# Patient Record
Sex: FEMALE | Race: WHITE | NOT HISPANIC OR LATINO | URBAN - METROPOLITAN AREA
[De-identification: names, ages, dates, MRNs, and addresses within clinical notes are randomized per-mention and may not be internally consistent; named-entity substitution may affect disease eponyms.]

---

## 2022-05-30 PROCEDURE — 99223 PR INITIAL HOSPITAL CARE,LEVL III: ICD-10-PCS | Mod: ,,, | Performed by: STUDENT IN AN ORGANIZED HEALTH CARE EDUCATION/TRAINING PROGRAM

## 2022-05-30 PROCEDURE — 99223 1ST HOSP IP/OBS HIGH 75: CPT | Mod: ,,, | Performed by: STUDENT IN AN ORGANIZED HEALTH CARE EDUCATION/TRAINING PROGRAM

## 2022-06-01 DIAGNOSIS — I82.4Y9 DEEP VEIN THROMBOSIS (DVT) OF PROXIMAL LOWER EXTREMITY, UNSPECIFIED CHRONICITY, UNSPECIFIED LATERALITY: Primary | ICD-10-CM

## 2022-06-08 ENCOUNTER — OUTSIDE PLACE OF SERVICE (OUTPATIENT)
Dept: ADMINISTRATIVE | Facility: OTHER | Age: 52
End: 2022-06-08
Payer: MEDICAID

## 2022-06-16 ENCOUNTER — OFFICE VISIT (OUTPATIENT)
Dept: HEMATOLOGY/ONCOLOGY | Facility: CLINIC | Age: 52
End: 2022-06-16
Payer: MEDICAID

## 2022-06-16 VITALS
HEIGHT: 65 IN | BODY MASS INDEX: 47.02 KG/M2 | SYSTOLIC BLOOD PRESSURE: 142 MMHG | HEART RATE: 77 BPM | DIASTOLIC BLOOD PRESSURE: 90 MMHG | OXYGEN SATURATION: 96 % | RESPIRATION RATE: 20 BRPM | WEIGHT: 282.19 LBS

## 2022-06-16 DIAGNOSIS — I26.99 OTHER ACUTE PULMONARY EMBOLISM WITHOUT ACUTE COR PULMONALE: ICD-10-CM

## 2022-06-16 DIAGNOSIS — I82.4Z1 ACUTE DEEP VEIN THROMBOSIS (DVT) OF DISTAL VEIN OF RIGHT LOWER EXTREMITY: ICD-10-CM

## 2022-06-16 DIAGNOSIS — Z13.9 ENCOUNTER FOR HEALTH-RELATED SCREENING: Primary | ICD-10-CM

## 2022-06-16 DIAGNOSIS — I82.4Y9 DEEP VEIN THROMBOSIS (DVT) OF PROXIMAL LOWER EXTREMITY, UNSPECIFIED CHRONICITY, UNSPECIFIED LATERALITY: ICD-10-CM

## 2022-06-16 PROCEDURE — 3077F PR MOST RECENT SYSTOLIC BLOOD PRESSURE >= 140 MM HG: ICD-10-PCS | Mod: CPTII,,, | Performed by: STUDENT IN AN ORGANIZED HEALTH CARE EDUCATION/TRAINING PROGRAM

## 2022-06-16 PROCEDURE — 1159F PR MEDICATION LIST DOCUMENTED IN MEDICAL RECORD: ICD-10-PCS | Mod: CPTII,,, | Performed by: STUDENT IN AN ORGANIZED HEALTH CARE EDUCATION/TRAINING PROGRAM

## 2022-06-16 PROCEDURE — 99417 PROLNG OP E/M EACH 15 MIN: CPT | Mod: ,,, | Performed by: STUDENT IN AN ORGANIZED HEALTH CARE EDUCATION/TRAINING PROGRAM

## 2022-06-16 PROCEDURE — 3008F BODY MASS INDEX DOCD: CPT | Mod: CPTII,,, | Performed by: STUDENT IN AN ORGANIZED HEALTH CARE EDUCATION/TRAINING PROGRAM

## 2022-06-16 PROCEDURE — 99215 OFFICE O/P EST HI 40 MIN: CPT | Mod: ,,, | Performed by: STUDENT IN AN ORGANIZED HEALTH CARE EDUCATION/TRAINING PROGRAM

## 2022-06-16 PROCEDURE — 3077F SYST BP >= 140 MM HG: CPT | Mod: CPTII,,, | Performed by: STUDENT IN AN ORGANIZED HEALTH CARE EDUCATION/TRAINING PROGRAM

## 2022-06-16 PROCEDURE — 3080F PR MOST RECENT DIASTOLIC BLOOD PRESSURE >= 90 MM HG: ICD-10-PCS | Mod: CPTII,,, | Performed by: STUDENT IN AN ORGANIZED HEALTH CARE EDUCATION/TRAINING PROGRAM

## 2022-06-16 PROCEDURE — 1159F MED LIST DOCD IN RCRD: CPT | Mod: CPTII,,, | Performed by: STUDENT IN AN ORGANIZED HEALTH CARE EDUCATION/TRAINING PROGRAM

## 2022-06-16 PROCEDURE — 99215 PR OFFICE/OUTPT VISIT, EST, LEVL V, 40-54 MIN: ICD-10-PCS | Mod: ,,, | Performed by: STUDENT IN AN ORGANIZED HEALTH CARE EDUCATION/TRAINING PROGRAM

## 2022-06-16 PROCEDURE — 3008F PR BODY MASS INDEX (BMI) DOCUMENTED: ICD-10-PCS | Mod: CPTII,,, | Performed by: STUDENT IN AN ORGANIZED HEALTH CARE EDUCATION/TRAINING PROGRAM

## 2022-06-16 PROCEDURE — 99417 PR PROLONGED SVC, OUTPT, W/WO DIRECT PT CONTACT,  EA ADDTL 15 MIN: ICD-10-PCS | Mod: ,,, | Performed by: STUDENT IN AN ORGANIZED HEALTH CARE EDUCATION/TRAINING PROGRAM

## 2022-06-16 PROCEDURE — 3080F DIAST BP >= 90 MM HG: CPT | Mod: CPTII,,, | Performed by: STUDENT IN AN ORGANIZED HEALTH CARE EDUCATION/TRAINING PROGRAM

## 2022-06-16 RX ORDER — APIXABAN 5 MG/1
1 TABLET, FILM COATED ORAL 2 TIMES DAILY
COMMUNITY
Start: 2022-06-06 | End: 2022-07-05 | Stop reason: SDUPTHER

## 2022-06-16 NOTE — PROGRESS NOTES
Referring provider:  [facility] consult  Reason for consult:  Unprovoked right lower extremity DVT with bilateral PE    HPI  Patient is a 51-year-old without significant medical history except for BCT and smoking who presented to hospital with symptoms of right leg pain and shortness of breath over the last couple weeks prior to presentation.  In the hospital she was found to have bilateral PE without right heart strain as well as right lower extremity DVT from the proximal femoral vein throughout the proximal lower leg.  Patient CT chest PE protocol also revealed a possibility of evolving left lung base infarct.  Patient started on Lovenox in the hospital and transition to Eliquis prior to discharge.  Today patient reports improvement in his symptoms of right lower extremity swelling however persistent discomfort.  She denies any chest pain or palpitations or shortness of breath.  She reports compliance with Eliquis currently on 5 mg b.i.d. after finishing a loading.  She denies any symptoms of clinical bleeding.    Patient denies any family history of blood disorders.  She has a family history of breast cancer in aunt, was a current smoker when she was diagnosed with DVT, at least 20 pack-year smoking history.  Denied any alcohol use or recreational drug use.  She is  with an ECOG of 0.  Works with the [employer].    Imaging  2022 right lower extremity ultrasound:  Occlusive DVT from the proximal femoral vein through the proximal lower leg.    2022 CT chest PE protocol:  Bilateral pulmonary emboli most severely involving the left lower lobe, a small area of developing pulmonary opacities suggesting developing pulmonary infarct.    2022 echocardiogram with 60% EF, normal RA RV and LV size.  Mild LA enlargement.      Past Medical History:   Diagnosis Date    DVT (deep venous thrombosis)        Past Surgical History:   Procedure Laterality Date     SECTION      X3    DILATION AND  CURETTAGE OF UTERUS         Family History   Problem Relation Age of Onset    Heart disease Father        Social History     Socioeconomic History    Marital status: Single   Tobacco Use    Smoking status: Former Smoker     Years: 30.00     Quit date: 2022     Years since quittin.0    Smokeless tobacco: Never Used   Substance and Sexual Activity    Alcohol use: Yes     Comment: social       Current Outpatient Medications   Medication Sig Dispense Refill    ELIQUIS 5 mg Tab Take 1 tablet by mouth 2 (two) times a day.       No current facility-administered medications for this visit.       Review of patient's allergies indicates:  No Known Allergies    Review of system  CONSTITUTIONAL: no fevers, no chills, no weight loss, no fatigue, no weakness  HEMATOLOGIC: no abnormal bleeding, no abnormal bruising, no drenching night sweats  ONCOLOGIC: no new masses or lumps  HEENT: no vision loss, no tinnitus or hearing loss, no nose bleeding, no dysphagia, no odynophagia  CVS: no chest pain, no palpitations, no dyspnea on exertion, +RLE edema  RESP: no shortness of breath, no hemoptysis, no cough  GI: no nausea, no vomiting, no diarrhea, no constipation, no melena, no hematochezia, no hematemesis, no abdominal pain, no increase in abdominal girth  : no dysuria, no hematuria, no hesitancy, no scrotal swelling, no discharge  INTEGUMENT: no rashes, no abnormal bruising, no nail pitting, no hyperpigmentation  NEURO: no falls, no memory loss, no paresthesias or dysesthesias, no urofecal incontinence or retention, no loss of strength on any extremity  MSK: no back pain, no new joint pain, no joint swelling  PSYCH: no suicidal or homicidal ideation, no depression, no insomnia, no anhedonia, + intermittently tearful.  ENDOCRINE: no heat or cold intolerance, no polyuria, no polydipsia    Physical Exam:    Vitals:    22 1520   BP: (!) 142/90   Pulse: 77   Resp: 20       ECOG PS 0  GA: AAOx3, NAD  HEENT: NCAT,  PERRLA, EOMI, good dentition, moist oral mucous membranes  LYMPH: no cervical, axillary or supraclavicular adenopathy  CVS: s1s2 RRR, no M/R/G  RESP: CTA b/l, no crackles, no wheezes or rhonchi  ABD: soft, NT, ND, BS+, no hepatosplenomegaly, 1 cm lipoma in the right anterior abdominal 1.   EXT: no deformities, + 2+ right lower extremity edema, pitting.   SKIN: no rashes, warm and dry  NEURO: normal mentation, strength 5/5 on all 4 extremities, no sensory deficits    Assessment plan      # Right lower extremity DVT with bilateral PE, likely unprovoked  - Patient presented with symptoms of right lower extremity swelling and was found to have a right lower extremity extensive DVT as well as bilateral PE without right heart strain, concerning findings for evolving left lower lobe infarct.  - Risk factors including obesity and smoking.  No other obvious provoking factor.  - Patient is not up-to-date with her cancer screening.  - Discussed the plan to obtain hypercoagulable workup including Factor 5 Leiden, prothrombin gene mutation, antiphospholipid antibodies.  We will not obtain lupus anticoagulant, protein C protein S and antithrombin levels since Eliquis will interfere with this testing.  - Plan to continue Eliquis 5 mg b.i.d. for at least a period of 6 months pending above workup.  - Will schedule patient for a screening mammogram and referred to GI for screening colonoscopy.  - Plan to follow-up in clinic with repeat CBC, D-dimer level in 2 months.    Plan  - Continue Eliquis 5 mg b.i.d.  - CBC, CMP, factor 5 Leiden, prothrombin gene mutation, beta 2 glycoprotein, anticardiolipin antibodies, D-dimer level today  - Screening mammogram ordered today  - Referred to GI for screening colonoscopy  - Return to clinic in 2 months with repeat CBC and D-dimer levels to discuss above workup and further disease management.      A total of  60 minutes were spent in review of records, interpretation of test, coordination of  care, discussion and counseling with the patient.

## 2022-07-05 DIAGNOSIS — I82.4Y9 DEEP VEIN THROMBOSIS (DVT) OF PROXIMAL LOWER EXTREMITY, UNSPECIFIED CHRONICITY, UNSPECIFIED LATERALITY: Primary | ICD-10-CM

## 2022-07-05 RX ORDER — APIXABAN 5 MG/1
5 TABLET, FILM COATED ORAL 2 TIMES DAILY
Qty: 60 TABLET | Refills: 2 | Status: SHIPPED | OUTPATIENT
Start: 2022-07-05 | End: 2022-10-03

## 2022-08-08 ENCOUNTER — TELEPHONE (OUTPATIENT)
Dept: ADMINISTRATIVE | Facility: HOSPITAL | Age: 52
End: 2022-08-08
Payer: MEDICAID

## 2022-08-08 NOTE — TELEPHONE ENCOUNTER
Mammogram is scheduled 08/17/22 @ [facility] w/ 7:15 arrival Unable to reach patient -- left v/m w/ appt location, date & time

## 2022-08-24 ENCOUNTER — OFFICE VISIT (OUTPATIENT)
Dept: HEMATOLOGY/ONCOLOGY | Facility: CLINIC | Age: 52
End: 2022-08-24
Payer: MEDICAID

## 2022-08-24 VITALS
HEIGHT: 64 IN | TEMPERATURE: 98 F | SYSTOLIC BLOOD PRESSURE: 135 MMHG | OXYGEN SATURATION: 97 % | HEART RATE: 77 BPM | DIASTOLIC BLOOD PRESSURE: 98 MMHG | BODY MASS INDEX: 47.48 KG/M2 | RESPIRATION RATE: 18 BRPM | WEIGHT: 278.13 LBS

## 2022-08-24 DIAGNOSIS — I82.4Z1 ACUTE DEEP VEIN THROMBOSIS (DVT) OF DISTAL VEIN OF RIGHT LOWER EXTREMITY: Primary | ICD-10-CM

## 2022-08-24 PROCEDURE — 3008F PR BODY MASS INDEX (BMI) DOCUMENTED: ICD-10-PCS | Mod: CPTII,,, | Performed by: INTERNAL MEDICINE

## 2022-08-24 PROCEDURE — 3008F BODY MASS INDEX DOCD: CPT | Mod: CPTII,,, | Performed by: INTERNAL MEDICINE

## 2022-08-24 PROCEDURE — 1159F PR MEDICATION LIST DOCUMENTED IN MEDICAL RECORD: ICD-10-PCS | Mod: CPTII,,, | Performed by: INTERNAL MEDICINE

## 2022-08-24 PROCEDURE — 3080F DIAST BP >= 90 MM HG: CPT | Mod: CPTII,,, | Performed by: INTERNAL MEDICINE

## 2022-08-24 PROCEDURE — 99214 PR OFFICE/OUTPT VISIT, EST, LEVL IV, 30-39 MIN: ICD-10-PCS | Mod: ,,, | Performed by: INTERNAL MEDICINE

## 2022-08-24 PROCEDURE — 3080F PR MOST RECENT DIASTOLIC BLOOD PRESSURE >= 90 MM HG: ICD-10-PCS | Mod: CPTII,,, | Performed by: INTERNAL MEDICINE

## 2022-08-24 PROCEDURE — 3075F PR MOST RECENT SYSTOLIC BLOOD PRESS GE 130-139MM HG: ICD-10-PCS | Mod: CPTII,,, | Performed by: INTERNAL MEDICINE

## 2022-08-24 PROCEDURE — 99214 OFFICE O/P EST MOD 30 MIN: CPT | Mod: ,,, | Performed by: INTERNAL MEDICINE

## 2022-08-24 PROCEDURE — 3075F SYST BP GE 130 - 139MM HG: CPT | Mod: CPTII,,, | Performed by: INTERNAL MEDICINE

## 2022-08-24 PROCEDURE — 1159F MED LIST DOCD IN RCRD: CPT | Mod: CPTII,,, | Performed by: INTERNAL MEDICINE

## 2022-08-24 NOTE — PROGRESS NOTES
DATE:  2022    PROBLEM:  F/U vaso-occlusive Dz    HxPI:  Patient is a 51-year-old without significant medical history except for BCT and smoking who presented to hospital with symptoms of right leg pain and shortness of breath over the last couple weeks prior to presentation.  In the hospital she was found to have bilateral PE without right heart strain as well as right lower extremity DVT from the proximal femoral vein throughout the proximal lower leg.  Patient CT chest PE protocol also revealed a possibility of evolving left lung base infarct.  Patient started on Lovenox in the hospital and transition to Eliquis    INTERVALHx:  2022  Feels well.  Voices no complaints.  Has some chronic discomfort involving her left ankle which she attributed sisters arthritic changes that predates her DVT event.  Patient denies any family history of blood disorders.  She has a family history of breast cancer in aunt, was a current smoker when she was diagnosed with DVT, at least 20 pack-year smoking history.  Denied any alcohol use or recreational drug use.  She is  with an ECOG of 0.  Works with the [employer].    IMAGIN2022 right lower extremity ultrasound:  Occlusive DVT from the proximal femoral vein through the proximal lower leg.    2022 CT chest PE protocol:  Bilateral pulmonary emboli most severely involving the left lower lobe, a small area of developing pulmonary opacities suggesting developing pulmonary infarct.    2022 echocardiogram with 60% EF, normal RA RV and LV size.  Mild LA enlargement.      Past Medical History:   Diagnosis Date    DVT (deep venous thrombosis)        Past Surgical History:   Procedure Laterality Date     SECTION      X3    DILATION AND CURETTAGE OF UTERUS         Family History   Problem Relation Age of Onset    Heart disease Father        Social History     Socioeconomic History    Marital status: Single   Tobacco Use    Smoking  status: Former Smoker     Years: 30.00     Quit date: 2022     Years since quittin.2    Smokeless tobacco: Never Used   Substance and Sexual Activity    Alcohol use: Yes     Comment: social    Drug use: Never       Current Outpatient Medications   Medication Sig Dispense Refill    ELIQUIS 5 mg Tab Take 1 tablet (5 mg total) by mouth 2 (two) times a day. 60 tablet 2     No current facility-administered medications for this visit.       Review of patient's allergies indicates:  No Known Allergies    ROS:  CONSTITUTIONAL: no fevers, no chills, no weight loss, no fatigue, no weakness  HEMATOLOGIC: no abnormal bleeding, no abnormal bruising, no drenching night sweats  ONCOLOGIC: no new masses or lumps  HEENT: no vision loss, no tinnitus or hearing loss, no nose bleeding, no dysphagia, no odynophagia  CVS: no chest pain, no palpitations, no dyspnea on exertion, +RLE edema  RESP: no shortness of breath, no hemoptysis, no cough  GI: no nausea, no vomiting, no diarrhea, no constipation, no melena, no hematochezia, no hematemesis, no abdominal pain, no increase in abdominal girth  : no dysuria, no hematuria, no hesitancy, no scrotal swelling, no discharge  INTEGUMENT: no rashes, no abnormal bruising, no nail pitting, no hyperpigmentation  NEURO: no falls, no memory loss, no paresthesias or dysesthesias, no urofecal incontinence or retention, no loss of strength on any extremity  MSK: no back pain, no new joint pain, no joint swelling  PSYCH: no suicidal or homicidal ideation, no depression, no insomnia, no anhedonia, + intermittently tearful.  ENDOCRINE: no heat or cold intolerance, no polyuria, no polydipsia    OBSERVATIONS:  ECOG PS 0  VS:  Afebrile.  135/98  GENERAL:  Obese white female.  AAOx3, NAD  HEENT: NCAT, PERRLA, EOMI, good dentition, moist oral mucous membranes  LYMPH: no cervical, axillary or supraclavicular adenopathy  CVS: s1s2 RRR, no M/R/G  RESP: CTA b/l, no crackles, no wheezes or  rhonchi  ABD: soft, NT, ND, BS+, no hepatosplenomegaly, 1 cm lipoma in the right anterior abdominal 1.   EXT: no deformities, + 2+ right lower extremity edema, pitting.   SKIN: no rashes, warm and dry  NEURO: normal mentation, strength 5/5 on all 4 extremities, no sensory deficits    ASSESSMENT:  Apparent unprovoked DVT and PE.  Currently on anticoagulation therapy.    PLAN:  - Continue Eliquis 5 mg b.i.d.  - Complete hypercoag workup that was previously initiated.    MAGGIE CRESPO M.D., [facility]

## 2022-09-19 PROBLEM — Z13.9 ENCOUNTER FOR HEALTH-RELATED SCREENING: Status: RESOLVED | Noted: 2022-06-16 | Resolved: 2022-09-19

## 2022-10-26 ENCOUNTER — OFFICE VISIT (OUTPATIENT)
Dept: HEMATOLOGY/ONCOLOGY | Facility: CLINIC | Age: 52
End: 2022-10-26
Payer: MEDICAID

## 2022-10-26 VITALS
TEMPERATURE: 98 F | RESPIRATION RATE: 18 BRPM | WEIGHT: 278.19 LBS | BODY MASS INDEX: 46.35 KG/M2 | HEIGHT: 65 IN | SYSTOLIC BLOOD PRESSURE: 162 MMHG | OXYGEN SATURATION: 95 % | HEART RATE: 78 BPM | DIASTOLIC BLOOD PRESSURE: 88 MMHG

## 2022-10-26 DIAGNOSIS — I82.4Z1 ACUTE DEEP VEIN THROMBOSIS (DVT) OF DISTAL VEIN OF RIGHT LOWER EXTREMITY: ICD-10-CM

## 2022-10-26 DIAGNOSIS — N63.21 MASS OF UPPER OUTER QUADRANT OF LEFT BREAST: Primary | ICD-10-CM

## 2022-10-26 PROCEDURE — 3077F SYST BP >= 140 MM HG: CPT | Mod: CPTII,,, | Performed by: INTERNAL MEDICINE

## 2022-10-26 PROCEDURE — 1159F MED LIST DOCD IN RCRD: CPT | Mod: CPTII,,, | Performed by: INTERNAL MEDICINE

## 2022-10-26 PROCEDURE — 1159F PR MEDICATION LIST DOCUMENTED IN MEDICAL RECORD: ICD-10-PCS | Mod: CPTII,,, | Performed by: INTERNAL MEDICINE

## 2022-10-26 PROCEDURE — 1160F PR REVIEW ALL MEDS BY PRESCRIBER/CLIN PHARMACIST DOCUMENTED: ICD-10-PCS | Mod: CPTII,,, | Performed by: INTERNAL MEDICINE

## 2022-10-26 PROCEDURE — 3077F PR MOST RECENT SYSTOLIC BLOOD PRESSURE >= 140 MM HG: ICD-10-PCS | Mod: CPTII,,, | Performed by: INTERNAL MEDICINE

## 2022-10-26 PROCEDURE — 3079F PR MOST RECENT DIASTOLIC BLOOD PRESSURE 80-89 MM HG: ICD-10-PCS | Mod: CPTII,,, | Performed by: INTERNAL MEDICINE

## 2022-10-26 PROCEDURE — 3079F DIAST BP 80-89 MM HG: CPT | Mod: CPTII,,, | Performed by: INTERNAL MEDICINE

## 2022-10-26 PROCEDURE — 1160F RVW MEDS BY RX/DR IN RCRD: CPT | Mod: CPTII,,, | Performed by: INTERNAL MEDICINE

## 2022-10-26 PROCEDURE — 99214 PR OFFICE/OUTPT VISIT, EST, LEVL IV, 30-39 MIN: ICD-10-PCS | Mod: ,,, | Performed by: INTERNAL MEDICINE

## 2022-10-26 PROCEDURE — 99214 OFFICE O/P EST MOD 30 MIN: CPT | Mod: ,,, | Performed by: INTERNAL MEDICINE

## 2022-10-26 RX ORDER — FLUTICASONE PROPIONATE 50 MCG
2 SPRAY, SUSPENSION (ML) NASAL DAILY
COMMUNITY
Start: 2022-10-12

## 2022-10-26 NOTE — PROGRESS NOTES
DATE:  10/26/2022    PROBLEM:  1.)   F/U vaso-occlusive Dz. Negative workup from hypercoagulation panel.  2.)   Morbid obesity.  3.)   Prior heavy smoking history    HxPI:  Patient is a 51-year-old without significant medical history except for BCT and smoking who presented to hospital with symptoms of right leg pain and shortness of breath over the last couple weeks prior to presentation.  In the hospital she was found to have bilateral PE without right heart strain as well as right lower extremity DVT from the proximal femoral vein throughout the proximal lower leg.  Patient CT chest PE protocol also revealed a possibility of evolving left lung base infarct.  Patient started on Lovenox in the hospital and transition to Eliquis    INTERVALHx:  10/26/2022  Hypercoagulation workup has been negative thus far.  Patient has quit smoking since last seen.  Continues on Eliquis.  Continues to rosario morbid obesity.    Patient denies any family history of blood disorders.  She has a family history of breast cancer in aunt, was a current smoker when she was diagnosed with DVT, at least 20 pack-year smoking history.  Denied any alcohol use or recreational drug use.  She is  with an ECOG of 0.  Works with the [employer].    IMAGIN2022 right lower extremity ultrasound:  Occlusive DVT from the proximal femoral vein through the proximal lower leg.    2022 CT chest PE protocol:  Bilateral pulmonary emboli most severely involving the left lower lobe, a small area of developing pulmonary opacities suggesting developing pulmonary infarct.    2022 echocardiogram with 60% EF, normal RA RV and LV size.  Mild LA enlargement.      Past Medical History:   Diagnosis Date    DVT (deep venous thrombosis)        Past Surgical History:   Procedure Laterality Date     SECTION      X3    DILATION AND CURETTAGE OF UTERUS         Family History   Problem Relation Age of Onset    Heart disease Father         Social History     Socioeconomic History    Marital status: Single   Tobacco Use    Smoking status: Former     Years: 30.00     Types: Cigarettes     Quit date: 2022     Years since quittin.4    Smokeless tobacco: Never   Substance and Sexual Activity    Alcohol use: Yes     Comment: social    Drug use: Never       Current Outpatient Medications   Medication Sig Dispense Refill    ELIQUIS 5 mg Tab TAKE 1 TABLET(5 MG) BY MOUTH TWICE DAILY 60 tablet 2    fluticasone propionate (FLONASE) 50 mcg/actuation nasal spray 2 sprays by Each Nostril route once daily.       No current facility-administered medications for this visit.       Review of patient's allergies indicates:  No Known Allergies    ROS:  CONSTITUTIONAL: no fevers, no chills, no weight loss, no fatigue, no weakness  HEMATOLOGIC: no abnormal bleeding, no abnormal bruising, no drenching night sweats  ONCOLOGIC: no new masses or lumps  HEENT: no vision loss, no tinnitus or hearing loss, no nose bleeding, no dysphagia, no odynophagia  CVS: no chest pain, no palpitations, no dyspnea on exertion, +RLE edema  RESP: no shortness of breath, no hemoptysis, no cough  GI: no nausea, no vomiting, no diarrhea, no constipation, no melena, no hematochezia, no hematemesis, no abdominal pain, no increase in abdominal girth  : no dysuria, no hematuria, no hesitancy, no scrotal swelling, no discharge  INTEGUMENT: no rashes, no abnormal bruising, no nail pitting, no hyperpigmentation  NEURO: no falls, no memory loss, no paresthesias or dysesthesias, no urofecal incontinence or retention, no loss of strength on any extremity  MSK: no back pain, no new joint pain, no joint swelling  PSYCH: no suicidal or homicidal ideation, no depression, no insomnia, no anhedonia, + intermittently tearful.  ENDOCRINE: no heat or cold intolerance, no polyuria, no polydipsia    OBSERVATIONS:  ECOG PS 0  VS:  Afebrile.  135/98  GENERAL:  Obese white female.  AAOx3, NAD  HEENT: NCAT,  PERRLA, EOMI, good dentition, moist oral mucous membranes  LYMPH: no cervical, axillary or supraclavicular adenopathy  CVS: s1s2 RRR, no M/R/G  RESP: CTA b/l, no crackles, no wheezes or rhonchi  ABD: soft, NT, ND, BS+, no hepatosplenomegaly, 1 cm lipoma in the right anterior abdominal 1.   EXT: no deformities, + 2+ right lower extremity edema, pitting.   SKIN: no rashes, warm and dry  NEURO: normal mentation, strength 5/5 on all 4 extremities, no sensory deficits    ASSESSMENT:  Apparent unprovoked DVT and PE.    Complete hypercoagulation profile negative.  Only risk factor is her morbid obesity and prior heavy smoking history.  Patient did quit smoking since last seen.    PLAN:  -  Continue Eliquis 5 mg b.i.d.  -  Follow-up recheck in 3 months.  The patient is able to lose a substantial amount of weight and continues to maintain abstinence from tobacco use, only then will discontinuation of Eliquis be entertained.  - Per request of radiology service, we will order left breast ultrasound as last routine mammogram was incomplete BI-RADS 0.    MAGIGE CRESPO M.D., [facility]

## 2023-01-24 DIAGNOSIS — I82.4Y9 DEEP VEIN THROMBOSIS (DVT) OF PROXIMAL LOWER EXTREMITY, UNSPECIFIED CHRONICITY, UNSPECIFIED LATERALITY: Primary | ICD-10-CM

## 2023-02-28 ENCOUNTER — OFFICE VISIT (OUTPATIENT)
Dept: HEMATOLOGY/ONCOLOGY | Facility: CLINIC | Age: 53
End: 2023-02-28
Payer: MEDICAID

## 2023-02-28 VITALS
SYSTOLIC BLOOD PRESSURE: 132 MMHG | DIASTOLIC BLOOD PRESSURE: 83 MMHG | HEIGHT: 65 IN | WEIGHT: 285.63 LBS | HEART RATE: 94 BPM | TEMPERATURE: 98 F | OXYGEN SATURATION: 95 % | BODY MASS INDEX: 47.59 KG/M2 | RESPIRATION RATE: 20 BRPM

## 2023-02-28 DIAGNOSIS — I26.99 OTHER ACUTE PULMONARY EMBOLISM WITHOUT ACUTE COR PULMONALE: ICD-10-CM

## 2023-02-28 DIAGNOSIS — Z00.00 HEALTHCARE MAINTENANCE: Primary | ICD-10-CM

## 2023-02-28 DIAGNOSIS — I82.4Y9 DEEP VEIN THROMBOSIS (DVT) OF PROXIMAL LOWER EXTREMITY, UNSPECIFIED CHRONICITY, UNSPECIFIED LATERALITY: ICD-10-CM

## 2023-02-28 DIAGNOSIS — I82.4Z1 ACUTE DEEP VEIN THROMBOSIS (DVT) OF DISTAL VEIN OF RIGHT LOWER EXTREMITY: ICD-10-CM

## 2023-02-28 PROCEDURE — 1159F PR MEDICATION LIST DOCUMENTED IN MEDICAL RECORD: ICD-10-PCS | Mod: CPTII,,,

## 2023-02-28 PROCEDURE — 1159F MED LIST DOCD IN RCRD: CPT | Mod: CPTII,,,

## 2023-02-28 PROCEDURE — 3079F PR MOST RECENT DIASTOLIC BLOOD PRESSURE 80-89 MM HG: ICD-10-PCS | Mod: CPTII,,,

## 2023-02-28 PROCEDURE — 3008F PR BODY MASS INDEX (BMI) DOCUMENTED: ICD-10-PCS | Mod: CPTII,,,

## 2023-02-28 PROCEDURE — 3075F SYST BP GE 130 - 139MM HG: CPT | Mod: CPTII,,,

## 2023-02-28 PROCEDURE — 3079F DIAST BP 80-89 MM HG: CPT | Mod: CPTII,,,

## 2023-02-28 PROCEDURE — 3008F BODY MASS INDEX DOCD: CPT | Mod: CPTII,,,

## 2023-02-28 PROCEDURE — 99214 OFFICE O/P EST MOD 30 MIN: CPT | Mod: ,,,

## 2023-02-28 PROCEDURE — 3075F PR MOST RECENT SYSTOLIC BLOOD PRESS GE 130-139MM HG: ICD-10-PCS | Mod: CPTII,,,

## 2023-02-28 PROCEDURE — 99214 PR OFFICE/OUTPT VISIT, EST, LEVL IV, 30-39 MIN: ICD-10-PCS | Mod: ,,,

## 2023-02-28 RX ORDER — APIXABAN 5 MG/1
5 TABLET, FILM COATED ORAL 2 TIMES DAILY
Qty: 60 TABLET | Refills: 5 | Status: SHIPPED | OUTPATIENT
Start: 2023-02-28 | End: 2023-04-14 | Stop reason: SDUPTHER

## 2023-02-28 NOTE — PROGRESS NOTES
Referring provider:  [facility] consult  Reason for consult:  Unprovoked right lower extremity DVT with bilateral PE    HPI  Patient is a 51-year-old without significant medical history except for BCT and smoking who presented to hospital with symptoms of right leg pain and shortness of breath over the last couple weeks prior to presentation.  In the hospital she was found to have bilateral PE without right heart strain as well as right lower extremity DVT from the proximal femoral vein throughout the proximal lower leg.  Patient CT chest PE protocol also revealed a possibility of evolving left lung base infarct.  Patient started on Lovenox in the hospital and transition to Eliquis prior to discharge.  Today patient reports improvement in his symptoms of right lower extremity swelling however persistent discomfort.  She denies any chest pain or palpitations or shortness of breath.  She reports compliance with Eliquis currently on 5 mg b.i.d. after finishing a loading.  She denies any symptoms of clinical bleeding.    Patient denies any family history of blood disorders.  She has a family history of breast cancer in aunt, was a current smoker when she was diagnosed with DVT, at least 20 pack-year smoking history.  Denied any alcohol use or recreational drug use.  She is  with an ECOG of 0.  Works with the [employer].       INTERVALHx:  02/28/23  Feels well.  Voices no complaints. Hypercoagulation workup has been negative.  Patient continues compliance with smoking cessation.  Continues on Eliquis.  Continues to rosario morbid obesity.      Labs  08/24/23: antithrombin 113, cardiolipin Antibody IgG <10, homocysteine 14, rheumatoid factor <10, complement component 3 162, complement component 4 46, SPENCER NEG, protein C 193, Protein S antigen 147      Imaging  05/27/2022 right lower extremity ultrasound:  Occlusive DVT from the proximal femoral vein through the proximal lower leg.    05/27/2022 CT chest PE protocol:   Bilateral pulmonary emboli most severely involving the left lower lobe, a small area of developing pulmonary opacities suggesting developing pulmonary infarct.    2022 echocardiogram with 60% EF, normal RA RV and LV size.  Mild LA enlargement.      Past Medical History:   Diagnosis Date    Allergy     Depression     DVT (deep venous thrombosis)        Past Surgical History:   Procedure Laterality Date     SECTION      X3    DILATION AND CURETTAGE OF UTERUS         Family History   Problem Relation Age of Onset    Heart disease Father        Social History     Socioeconomic History    Marital status: Single   Tobacco Use    Smoking status: Former     Years: 30.00     Types: Cigarettes     Quit date: 2022     Years since quittin.7    Smokeless tobacco: Never   Substance and Sexual Activity    Alcohol use: Yes     Comment: social    Drug use: Never       Current Outpatient Medications   Medication Sig Dispense Refill    apixaban (ELIQUIS) 5 mg Tab Take 1 tablet (5 mg total) by mouth 2 (two) times daily. 60 tablet 1    ELIQUIS 5 mg Tab TAKE 1 TABLET(5 MG) BY MOUTH TWICE DAILY (Patient not taking: Reported on 2023) 60 tablet 5    fluticasone propionate (FLONASE) 50 mcg/actuation nasal spray 2 sprays by Each Nostril route once daily.       No current facility-administered medications for this visit.       Review of patient's allergies indicates:  No Known Allergies    Review of system  CONSTITUTIONAL: no fevers, no chills, no weight loss, no fatigue, no weakness  HEMATOLOGIC: no abnormal bleeding, no abnormal bruising, no drenching night sweats  ONCOLOGIC: no new masses or lumps  HEENT: no vision loss, no tinnitus or hearing loss, no nose bleeding, no dysphagia, no odynophagia  CVS: no chest pain, no palpitations, no dyspnea on exertion, +RLE edema  RESP: no shortness of breath, no hemoptysis, no cough  GI: no nausea, no vomiting, no diarrhea, no constipation, no melena, no hematochezia, no  hematemesis, no abdominal pain, no increase in abdominal girth  : no dysuria, no hematuria, no hesitancy, no scrotal swelling, no discharge  INTEGUMENT: no rashes, no abnormal bruising, no nail pitting, no hyperpigmentation  NEURO: no falls, no memory loss, no paresthesias or dysesthesias, no urofecal incontinence or retention, no loss of strength on any extremity  MSK: no back pain, no new joint pain, no joint swelling  PSYCH: no suicidal or homicidal ideation, no depression, no insomnia, no anhedonia, + intermittently tearful.  ENDOCRINE: no heat or cold intolerance, no polyuria, no polydipsia    Physical Exam:    Vitals:    02/28/23 1442   BP: 132/83   Pulse: 94   Resp: 20   Temp: 98.2 °F (36.8 °C)       ECOG PS 0  GA: AAOx3, NAD  HEENT: NCAT, PERRLA, EOMI, good dentition, moist oral mucous membranes  LYMPH: no cervical, axillary or supraclavicular adenopathy  CVS: s1s2 RRR, no M/R/G  RESP: CTA b/l, no crackles, no wheezes or rhonchi  ABD: soft, NT, ND, BS+, no hepatosplenomegaly, 1 cm lipoma in the right anterior abdominal 1.   EXT: no deformities, + 2+ right lower extremity edema, pitting.   SKIN: no rashes, warm and dry  NEURO: normal mentation, strength 5/5 on all 4 extremities, no sensory deficits    Assessment plan      # Right lower extremity DVT with bilateral PE, likely unprovoked  - Patient presented with symptoms of right lower extremity swelling and was found to have a right lower extremity extensive DVT as well as bilateral PE without right heart strain, concerning findings for evolving left lower lobe infarct.  - Risk factors including obesity and smoking.  No other obvious provoking factor.  - Patient is not up-to-date with her cancer screening.  - Discussed the plan to obtain hypercoagulable workup including Factor 5 Leiden, prothrombin gene mutation, antiphospholipid antibodies.  We will not obtain lupus anticoagulant, protein C protein S and antithrombin levels since Eliquis will interfere  with this testing.  - Plan to continue Eliquis 5 mg b.i.d. for at least a period of 6 months pending above workup.  - Will schedule patient for a screening mammogram and referred to GI for screening colonoscopy.  - Plan to follow-up in clinic with repeat CBC, D-dimer level in 2 months.    Plan  - Continue Eliquis 5 mg b.i.d. refill sent to pharmacy today  - CBC, CMP, D-Dimer  - Referral for new PCP  - Return to clinic in 3 months

## 2023-04-14 DIAGNOSIS — I82.4Y9 DEEP VEIN THROMBOSIS (DVT) OF PROXIMAL LOWER EXTREMITY, UNSPECIFIED CHRONICITY, UNSPECIFIED LATERALITY: ICD-10-CM

## 2023-04-14 RX ORDER — APIXABAN 5 MG/1
5 TABLET, FILM COATED ORAL 2 TIMES DAILY
Qty: 60 TABLET | Refills: 5 | Status: SHIPPED | OUTPATIENT
Start: 2023-04-14

## 2023-06-09 ENCOUNTER — OFFICE VISIT (OUTPATIENT)
Dept: HEMATOLOGY/ONCOLOGY | Facility: CLINIC | Age: 53
End: 2023-06-09
Payer: MEDICAID

## 2023-06-09 VITALS
BODY MASS INDEX: 47.59 KG/M2 | HEIGHT: 65 IN | TEMPERATURE: 98 F | DIASTOLIC BLOOD PRESSURE: 90 MMHG | HEART RATE: 87 BPM | WEIGHT: 285.63 LBS | OXYGEN SATURATION: 94 % | RESPIRATION RATE: 18 BRPM | SYSTOLIC BLOOD PRESSURE: 138 MMHG

## 2023-06-09 DIAGNOSIS — E66.01 MORBID OBESITY WITH BMI OF 45.0-49.9, ADULT: ICD-10-CM

## 2023-06-09 DIAGNOSIS — I26.99 OTHER ACUTE PULMONARY EMBOLISM WITHOUT ACUTE COR PULMONALE: ICD-10-CM

## 2023-06-09 DIAGNOSIS — I82.4Z1 ACUTE DEEP VEIN THROMBOSIS (DVT) OF DISTAL VEIN OF RIGHT LOWER EXTREMITY: Primary | ICD-10-CM

## 2023-06-09 PROCEDURE — 3080F PR MOST RECENT DIASTOLIC BLOOD PRESSURE >= 90 MM HG: ICD-10-PCS | Mod: CPTII,,, | Performed by: INTERNAL MEDICINE

## 2023-06-09 PROCEDURE — 3075F SYST BP GE 130 - 139MM HG: CPT | Mod: CPTII,,, | Performed by: INTERNAL MEDICINE

## 2023-06-09 PROCEDURE — 1159F PR MEDICATION LIST DOCUMENTED IN MEDICAL RECORD: ICD-10-PCS | Mod: CPTII,,, | Performed by: INTERNAL MEDICINE

## 2023-06-09 PROCEDURE — 1160F RVW MEDS BY RX/DR IN RCRD: CPT | Mod: CPTII,,, | Performed by: INTERNAL MEDICINE

## 2023-06-09 PROCEDURE — 3008F PR BODY MASS INDEX (BMI) DOCUMENTED: ICD-10-PCS | Mod: CPTII,,, | Performed by: INTERNAL MEDICINE

## 2023-06-09 PROCEDURE — 99214 PR OFFICE/OUTPT VISIT, EST, LEVL IV, 30-39 MIN: ICD-10-PCS | Mod: ,,, | Performed by: INTERNAL MEDICINE

## 2023-06-09 PROCEDURE — 1159F MED LIST DOCD IN RCRD: CPT | Mod: CPTII,,, | Performed by: INTERNAL MEDICINE

## 2023-06-09 PROCEDURE — 3075F PR MOST RECENT SYSTOLIC BLOOD PRESS GE 130-139MM HG: ICD-10-PCS | Mod: CPTII,,, | Performed by: INTERNAL MEDICINE

## 2023-06-09 PROCEDURE — 3008F BODY MASS INDEX DOCD: CPT | Mod: CPTII,,, | Performed by: INTERNAL MEDICINE

## 2023-06-09 PROCEDURE — 99214 OFFICE O/P EST MOD 30 MIN: CPT | Mod: ,,, | Performed by: INTERNAL MEDICINE

## 2023-06-09 PROCEDURE — 3080F DIAST BP >= 90 MM HG: CPT | Mod: CPTII,,, | Performed by: INTERNAL MEDICINE

## 2023-06-09 PROCEDURE — 1160F PR REVIEW ALL MEDS BY PRESCRIBER/CLIN PHARMACIST DOCUMENTED: ICD-10-PCS | Mod: CPTII,,, | Performed by: INTERNAL MEDICINE
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
## 2023-12-21 ENCOUNTER — OFFICE VISIT (OUTPATIENT)
Dept: HEMATOLOGY/ONCOLOGY | Facility: CLINIC | Age: 53
End: 2023-12-21
Payer: MEDICAID

## 2023-12-21 VITALS
WEIGHT: 272.38 LBS | HEART RATE: 94 BPM | BODY MASS INDEX: 45.38 KG/M2 | RESPIRATION RATE: 23 BRPM | SYSTOLIC BLOOD PRESSURE: 137 MMHG | DIASTOLIC BLOOD PRESSURE: 79 MMHG | TEMPERATURE: 98 F | OXYGEN SATURATION: 95 % | HEIGHT: 65 IN

## 2023-12-21 DIAGNOSIS — I82.4Z1 ACUTE DEEP VEIN THROMBOSIS (DVT) OF DISTAL VEIN OF RIGHT LOWER EXTREMITY: ICD-10-CM

## 2023-12-21 DIAGNOSIS — I26.99 OTHER ACUTE PULMONARY EMBOLISM WITHOUT ACUTE COR PULMONALE: Primary | ICD-10-CM

## 2023-12-21 PROCEDURE — 3078F PR MOST RECENT DIASTOLIC BLOOD PRESSURE < 80 MM HG: ICD-10-PCS | Mod: CPTII,,,

## 2023-12-21 PROCEDURE — 3078F DIAST BP <80 MM HG: CPT | Mod: CPTII,,,

## 2023-12-21 PROCEDURE — 3075F SYST BP GE 130 - 139MM HG: CPT | Mod: CPTII,,,

## 2023-12-21 PROCEDURE — 1159F PR MEDICATION LIST DOCUMENTED IN MEDICAL RECORD: ICD-10-PCS | Mod: CPTII,,,

## 2023-12-21 PROCEDURE — 99214 OFFICE O/P EST MOD 30 MIN: CPT | Mod: ,,,

## 2023-12-21 PROCEDURE — 99214 PR OFFICE/OUTPT VISIT, EST, LEVL IV, 30-39 MIN: ICD-10-PCS | Mod: ,,,

## 2023-12-21 PROCEDURE — 3008F PR BODY MASS INDEX (BMI) DOCUMENTED: ICD-10-PCS | Mod: CPTII,,,

## 2023-12-21 PROCEDURE — 3008F BODY MASS INDEX DOCD: CPT | Mod: CPTII,,,

## 2023-12-21 PROCEDURE — 3075F PR MOST RECENT SYSTOLIC BLOOD PRESS GE 130-139MM HG: ICD-10-PCS | Mod: CPTII,,,

## 2023-12-21 PROCEDURE — 1159F MED LIST DOCD IN RCRD: CPT | Mod: CPTII,,,

## 2023-12-21 NOTE — PROGRESS NOTES
Referring provider:  [facility] consult  Reason for consult:  Unprovoked right lower extremity DVT with bilateral PE.    HPI  Patient is a 51-year-old without significant medical history except for BCT and smoking who presented to hospital with symptoms of right leg pain and shortness of breath over the last couple weeks prior to presentation.  In the hospital she was found to have bilateral PE without right heart strain as well as right lower extremity DVT from the proximal femoral vein throughout the proximal lower leg.  Patient CT chest PE protocol also revealed a possibility of evolving left lung base infarct.  Patient started on Lovenox in the hospital and transition to Eliquis prior to discharge.  Today patient reports improvement in his symptoms of right lower extremity swelling however persistent discomfort.  She denies any chest pain or palpitations or shortness of breath.  She reports compliance with Eliquis currently on 5 mg b.i.d. after finishing a loading.  She denies any symptoms of clinical bleeding.    Patient denies any family history of blood disorders.  She has a family history of breast cancer in aunt, was a current smoker when she was diagnosed with DVT, at least 20 pack-year smoking history.  Denied any alcohol use or recreational drug use.  She is  with an ECOG of 0.  Works with the [employer].    INTERVALHx:  12/21/23  Ms Schulte, returns for a routine visit to follow-up for an unprovoked DVT and bilateral pulmonary emboli. She has lost some weight since last visit. She has reduced meal portions and making healthier food choices. She continues with smoking cessation.  She is compliant with eliquis 5 mg BID. We discussed possible stopping Eliquis therapy but she declines at this time. She denies any sob, chest pain, cough, edema, weakness or dizziness. She has not had any ER or hospital visits. Recent D-dimer <200.     Labs:  12/12/23: CMP WNL, wbc 6.79, hgb 15.2, plt 299, ANC 3.64,  D-dimer <200.     Imaging  2022 right lower extremity ultrasound:  Occlusive DVT from the proximal femoral vein through the proximal lower leg.    2022 CT chest PE protocol:  Bilateral pulmonary emboli most severely involving the left lower lobe, a small area of developing pulmonary opacities suggesting developing pulmonary infarct.    2022 echocardiogram with 60% EF, normal RA RV and LV size.  Mild LA enlargement.      Past Medical History:   Diagnosis Date    Allergy     Depression     DVT (deep venous thrombosis)        Past Surgical History:   Procedure Laterality Date     SECTION      X3    DILATION AND CURETTAGE OF UTERUS         Family History   Problem Relation Age of Onset    Heart disease Father        Social History     Socioeconomic History    Marital status: Single   Tobacco Use    Smoking status: Former     Current packs/day: 0.00     Types: Cigarettes     Start date: 1992     Quit date: 2022     Years since quittin.5    Smokeless tobacco: Never   Substance and Sexual Activity    Alcohol use: Yes     Comment: social    Drug use: Never       Current Outpatient Medications   Medication Sig Dispense Refill    apixaban (ELIQUIS) 5 mg Tab Take 1 tablet (5 mg total) by mouth 2 (two) times daily. 60 tablet 1    ELIQUIS 5 mg Tab Take 1 tablet (5 mg total) by mouth 2 (two) times daily. (Patient not taking: Reported on 2023) 60 tablet 5    fluticasone propionate (FLONASE) 50 mcg/actuation nasal spray 2 sprays by Each Nostril route once daily.       No current facility-administered medications for this visit.       Review of patient's allergies indicates:  No Known Allergies    Review of system  CONSTITUTIONAL: no fevers, no chills, no weight loss, no fatigue, no weakness  HEMATOLOGIC: no abnormal bleeding, no abnormal bruising, no drenching night sweats  ONCOLOGIC: no new masses or lumps  HEENT: no vision loss, no tinnitus or hearing loss, no nose bleeding, no  "dysphagia, no odynophagia  CVS: no chest pain, no palpitations, no dyspnea on exertion, +RLE edema  RESP: no shortness of breath, no hemoptysis, no cough  GI: no nausea, no vomiting, no diarrhea, no constipation, no melena, no hematochezia, no hematemesis, no abdominal pain, no increase in abdominal girth  : no dysuria, no hematuria, no hesitancy, no scrotal swelling, no discharge  INTEGUMENT: no rashes, no abnormal bruising, no nail pitting, no hyperpigmentation  NEURO: no falls, no memory loss, no paresthesias or dysesthesias, no urofecal incontinence or retention, no loss of strength on any extremity  MSK: no back pain, no new joint pain, no joint swelling  PSYCH: no suicidal or homicidal ideation, no depression, no insomnia, no anhedonia, + intermittently tearful.  ENDOCRINE: no heat or cold intolerance, no polyuria, no polydipsia    Physical Exam:  Blood pressure 137/79, pulse 94, temperature 97.8 °F (36.6 °C), temperature source Oral, height 5' 4.6" (1.641 m), weight 123.6 kg (272 lb 6.4 oz), SpO2 95 %.   ECOG PS 1-2  GA: AAOx3, NAD  HEENT: NCAT, PERRLA, EOMI, good dentition, moist oral mucous membranes  LYMPH: no cervical, axillary or supraclavicular adenopathy  CVS: s1s2 RRR, no M/R/G  RESP: CTA b/l, no crackles, no wheezes or rhonchi  ABD: soft, NT, ND, BS+, no hepatosplenomegaly, 1 cm lipoma in the right anterior abdominal 1.   EXT: no deformities, + 2+ right lower extremity edema, pitting.   SKIN: no rashes, warm and dry  NEURO: normal mentation, strength 5/5 on all 4 extremities, no sensory deficits    Assessment plan  # Right lower extremity DVT with bilateral PE, likely unprovoked  - Patient presented with symptoms of right lower extremity swelling and was found to have a right lower extremity extensive DVT as well as bilateral PE without right heart strain, concerning findings for evolving left lower lobe infarct.  - Risk factors including obesity and smoking.  No other obvious provoking factor.  - " Patient is not up-to-date with her cancer screening.  - Discussed the plan to obtain hypercoagulable workup including Factor 5 Leiden, prothrombin gene mutation, antiphospholipid antibodies.  We will not obtain lupus anticoagulant, protein C protein S and antithrombin levels since Eliquis will interfere with this testing.  All other tests obtained on October of 2022 did not point to a thrombophilic state  - Plan to continue Eliquis 5 mg b.i.d. for at least a period of 6 months pending above workup.  - Discussed concept of discontinuing Eliquis given patient's sustained abstinence from smoking and low D-dimer level. Patient declines at this time and would like to continue on weight loss journey. She will reconsider at next clinic visit.   - If patient is agreeable to stopping Eliquis at next visit, we can plan to f/u clinically in 3 months after Eliquis D/C'd with repeat antiphospholipid panel, Protein C Ag and Protein S Ag, and antithrombin antigen.      Plan   - Continue Eliquis 5 mg b.i.d.  - CBC, CMP  - Return to clinic in  6 months

## 2024-07-11 NOTE — PROGRESS NOTES
Referring provider:  [facility] consult  Reason for consult:  Unprovoked right lower extremity DVT with bilateral PE.    HPI  Patient is a 51-year-old without significant medical history except for BCT and smoking who presented to hospital with symptoms of right leg pain and shortness of breath over the last couple weeks prior to presentation.  In the hospital she was found to have bilateral PE without right heart strain as well as right lower extremity DVT from the proximal femoral vein throughout the proximal lower leg.  Patient CT chest PE protocol also revealed a possibility of evolving left lung base infarct.  Patient started on Lovenox in the hospital and transition to Eliquis prior to discharge.  Today patient reports improvement in his symptoms of right lower extremity swelling however persistent discomfort.  She denies any chest pain or palpitations or shortness of breath.  She reports compliance with Eliquis currently on 5 mg b.i.d. after finishing a loading.  She denies any symptoms of clinical bleeding.    Patient denies any family history of blood disorders.  She has a family history of breast cancer in aunt, was a current smoker when she was diagnosed with DVT, at least 20 pack-year smoking history.  Denied any alcohol use or recreational drug use.  She is  with an ECOG of 0.  Works with the [employer].    INTERVALHx:    7/11/2024:  Ms. Schulte is here today for follow-up and labs regarding unprovoked DVT and bilateral pulmonary emboli.  Today, she reports, she stopped Eliquis after last visit.  She also reports returning to smoking a 1/2 ppd.  States, she is undergoing a lot of family stress.  She does not have a PCP at this time.  Offered to refer, but she declined (moving to [place] will find PCP once moved).  She denies chest pain, SOB, dry cough, heart palpitations, lower extremity swelling and pain, epistaxis, gingival bleeding, hemoptysis, hematemesis, hematuria, hematochezia, melena, and  excessive bruising.  Labs reviewed with patient dated 2024:  Creatinine 0.77, liver enzymes and T. Bili WNL, WBC 6.48, Hgb 13.7, 41.7, Plt 273,000.  Eating and drinking.  Weight is stable,  No recent hospitalizations, infections, or illnesses.         Labs:  2024:  Creatinine 0.77, liver enzymes and T. Bili WNL, WBC 6.48, Hgb 13.7, 41.7, Plt 273,000.    23: CMP WNL, wbc 6.79, hgb 15.2, plt 299, ANC 3.64, D-dimer <200.     Imaging  2022 right lower extremity ultrasound:  Occlusive DVT from the proximal femoral vein through the proximal lower leg.    2022 CT chest PE protocol:  Bilateral pulmonary emboli most severely involving the left lower lobe, a small area of developing pulmonary opacities suggesting developing pulmonary infarct.    2022 echocardiogram with 60% EF, normal RA RV and LV size.  Mild LA enlargement.      Past Medical History:   Diagnosis Date    Allergy     Depression     DVT (deep venous thrombosis)        Past Surgical History:   Procedure Laterality Date     SECTION      X3    DILATION AND CURETTAGE OF UTERUS         Family History   Problem Relation Name Age of Onset    Heart disease Father         Social History     Socioeconomic History    Marital status: Single   Tobacco Use    Smoking status: Unknown    Smokeless tobacco: Never   Substance and Sexual Activity    Alcohol use: Yes     Comment: social    Drug use: Never       Current Outpatient Medications   Medication Sig Dispense Refill    loratadine (CLARITIN REDITABS) 10 mg dissolvable tablet Take 10 mg by mouth once daily.      apixaban (ELIQUIS) 5 mg Tab Take 1 tablet (5 mg total) by mouth 2 (two) times daily. (Patient not taking: Reported on 2024) 60 tablet 1    ELIQUIS 5 mg Tab Take 1 tablet (5 mg total) by mouth 2 (two) times daily. (Patient not taking: Reported on 2023) 60 tablet 5    fluticasone propionate (FLONASE) 50 mcg/actuation nasal spray 2 sprays by Each Nostril route once daily.  "(Patient not taking: Reported on 7/12/2024)       No current facility-administered medications for this visit.       Review of patient's allergies indicates:  No Known Allergies    Review of system  CONSTITUTIONAL: no fevers, no chills, no weight loss, no fatigue, no weakness  HEMATOLOGIC: no abnormal bleeding, no abnormal bruising, no drenching night sweats  ONCOLOGIC: no new masses or lumps  HEENT: no vision loss, no tinnitus or hearing loss, no nose bleeding, no dysphagia, no odynophagia  CVS: no chest pain, no palpitations, no dyspnea on exertion, +RLE edema  RESP: no shortness of breath, no hemoptysis, no cough  GI: no nausea, no vomiting, no diarrhea, no constipation, no melena, no hematochezia, no hematemesis, no abdominal pain, no increase in abdominal girth  : no dysuria, no hematuria, no hesitancy, no scrotal swelling, no discharge  INTEGUMENT: no rashes, no abnormal bruising, no nail pitting, no hyperpigmentation  NEURO: no falls, no memory loss, no paresthesias or dysesthesias, no urofecal incontinence or retention, no loss of strength on any extremity  MSK: no back pain, no new joint pain, no joint swelling  PSYCH: no suicidal or homicidal ideation, no depression, no insomnia, no anhedonia, + intermittently tearful.  ENDOCRINE: no heat or cold intolerance, no polyuria, no polydipsia    Physical Exam:  Blood pressure 131/84, pulse 72, temperature 98.5 °F (36.9 °C), temperature source Oral, resp. rate 20, height 5' 4" (1.626 m), weight 124.6 kg (274 lb 9.6 oz), SpO2 98%.   ECOG PS 1-2  GA: AAOx3, NAD  HEENT: NCAT, PERRLA, EOMI, good dentition, moist oral mucous membranes  LYMPH: no cervical, axillary or supraclavicular adenopathy  CVS: s1s2 RRR, no M/R/G  RESP: CTA b/l, no crackles, no wheezes or rhonchi  ABD: soft, NT, ND, BS+, no hepatosplenomegaly, 1 cm lipoma in the right anterior abdominal 1.   EXT: no deformities, + 2+ right lower extremity edema, pitting.   SKIN: no rashes, warm and dry  NEURO: " normal mentation, strength 5/5 on all 4 extremities, no sensory deficits    Assessment plan  # Right lower extremity DVT with bilateral PE, likely unprovoked  - Patient presented with symptoms of right lower extremity swelling and was found to have a right lower extremity extensive DVT as well as bilateral PE without right heart strain, concerning findings for evolving left lower lobe infarct.  - Risk factors including obesity and smoking.  No other obvious provoking factor.  - Patient is not up-to-date with her cancer screening.  - Discussed the plan to obtain hypercoagulable workup including Factor 5 Leiden, prothrombin gene mutation, antiphospholipid antibodies.  We will not obtain lupus anticoagulant, protein C protein S and antithrombin levels since Eliquis will interfere with this testing.  All other tests obtained on October of 2022 did not point to a thrombophilic state  - Plan to continue Eliquis 5 mg b.i.d. for at least a period of 6 months pending above workup.  - Discussed concept of discontinuing Eliquis given patient's sustained abstinence from smoking and low D-dimer level. Patient declines at this time and would like to continue on weight loss journey. She will reconsider at next clinic visit.   - If patient is agreeable to stopping Eliquis at next visit, we can plan to f/u clinically in 3 months after Eliquis D/C'd with repeat antiphospholipid panel, Protein C Ag and Protein S Ag, and antithrombin antigen.      Plan   Discussed with patient the importance of tobacco cessation.  Counseled on tobacco cessation today, after discussing the adverse effects of tobacco use, including the increased risks of heart attack, stroke, emphysema, and COPD, and lung cancer, she was not ready to quit.  Strategies to maximize success were reviewed, including setting a quit date and reducing the number of cigarettes or amount and frequency of nicotine containing products used and behavioral therapy     She has not  taken Eliquis since her last visit.  Will send to the lab today for antiphospholipid panel, Protein C Ag, Protein S Ag, antithrombin antigen, and D-Dimer.  Spoke with SARAH Hein regarding telehealth visit in 2 weeks to discuss lab results.    Advised to establish a relationship with PCP for wellness visits, smoking cessation, and weight loss.      The patient is in agreement with today's plan of care.  All questions answered.  Patient is aware to contact our office for any problems or concerns prior to next visit.      Olamide Nelson, MSN-ED, APRN, AGACNP-BC, OCN

## 2024-07-12 ENCOUNTER — OFFICE VISIT (OUTPATIENT)
Dept: HEMATOLOGY/ONCOLOGY | Facility: CLINIC | Age: 54
End: 2024-07-12
Payer: MEDICAID

## 2024-07-12 VITALS
BODY MASS INDEX: 46.89 KG/M2 | OXYGEN SATURATION: 98 % | TEMPERATURE: 99 F | DIASTOLIC BLOOD PRESSURE: 84 MMHG | HEART RATE: 72 BPM | WEIGHT: 274.63 LBS | HEIGHT: 64 IN | RESPIRATION RATE: 20 BRPM | SYSTOLIC BLOOD PRESSURE: 131 MMHG

## 2024-07-12 DIAGNOSIS — I82.4Z1 ACUTE DEEP VEIN THROMBOSIS (DVT) OF DISTAL VEIN OF RIGHT LOWER EXTREMITY: Primary | ICD-10-CM

## 2024-07-12 DIAGNOSIS — I26.99 OTHER ACUTE PULMONARY EMBOLISM WITHOUT ACUTE COR PULMONALE: ICD-10-CM

## 2024-07-12 RX ORDER — LORATADINE 10 MG
10 TABLET,DISINTEGRATING ORAL DAILY
COMMUNITY

## 2024-07-26 ENCOUNTER — OFFICE VISIT (OUTPATIENT)
Dept: HEMATOLOGY/ONCOLOGY | Facility: CLINIC | Age: 54
End: 2024-07-26
Payer: MEDICAID

## 2024-07-26 VITALS — WEIGHT: 274 LBS | HEIGHT: 64 IN | BODY MASS INDEX: 46.78 KG/M2

## 2024-07-26 DIAGNOSIS — Z00.00 HEALTHCARE MAINTENANCE: Primary | ICD-10-CM

## 2024-07-26 NOTE — PROGRESS NOTES
Established Patient - Audio Only Telehealth Visit     The patient location is: Home  The chief complaint leading to consultation is: DVT  Visit type: Virtual visit with audio only (telephone)  Total time spent with patient: 15 minutes       The reason for the audio only service rather than synchronous audio and video virtual visit was related to technical difficulties or patient preference/necessity.     Each patient to whom I provide medical services by telemedicine is:  (1) informed of the relationship between the physician and patient and the respective role of any other health care provider with respect to management of the patient; and (2) notified that they may decline to receive medical services by telemedicine and may withdraw from such care at any time. Patient verbally consented to receive this service via voice-only telephone call.    Referring provider:  [facility] consult  Reason for consult:  Unprovoked right lower extremity DVT with bilateral PE.    HPI  Patient is a 51-year-old without significant medical history except for BCT and smoking who presented to hospital with symptoms of right leg pain and shortness of breath over the last couple weeks prior to presentation.  In the hospital she was found to have bilateral PE without right heart strain as well as right lower extremity DVT from the proximal femoral vein throughout the proximal lower leg.  Patient CT chest PE protocol also revealed a possibility of evolving left lung base infarct.  Patient started on Lovenox in the hospital and transition to Eliquis prior to discharge.  Today patient reports improvement in his symptoms of right lower extremity swelling however persistent discomfort.  She denies any chest pain or palpitations or shortness of breath.  She reports compliance with Eliquis currently on 5 mg b.i.d. after finishing a loading.  She denies any symptoms of clinical bleeding.    Patient denies any family history of blood disorders.  She has a  family history of breast cancer in aunt, was a current smoker when she was diagnosed with DVT, at least 20 pack-year smoking history.  Denied any alcohol use or recreational drug use.  She is  with an ECOG of 0.  Works with the [employer].    INTERVALHx:    07/26/2024:  Ms. Schulte is here today for follow-up and labs regarding unprovoked DVT and bilateral pulmonary emboli.  She has been off Eliquis for some time now and doing well. Her anticoagulant workup was unremarkable for any deficiencies.   She continues smoking a 1/2 ppd daily.  States, she is undergoing a lot of family stress.  She denies chest pain, SOB, dry cough, heart palpitations, lower extremity swelling and pain, epistaxis, gingival bleeding, hemoptysis, hematemesis, hematuria, hematochezia, melena, and excessive bruising   Eating and drinking.  Weight is stable,  No recent hospitalizations, infections, or illnesses.         Labs:  07/12/24: CMP and CBC unremarkable, D-Dimer <200, Antithrombin antigen 111,Beta-2 glycoprotein I IgG and IgM <9, Anticardiolipin IgG and IgM <9, Lupus anticoagulant Negative, Protein S 135, Protein S free 146, Protein C 143, Factor II DNA analysis Not Detected.   7/9/2024:  Creatinine 0.77, liver enzymes and T. Bili WNL, WBC 6.48, Hgb 13.7, 41.7, Plt 273,000.    12/12/23: CMP WNL, wbc 6.79, hgb 15.2, plt 299, ANC 3.64, D-dimer <200.     Imaging  05/27/2022 right lower extremity ultrasound:  Occlusive DVT from the proximal femoral vein through the proximal lower leg.    05/27/2022 CT chest PE protocol:  Bilateral pulmonary emboli most severely involving the left lower lobe, a small area of developing pulmonary opacities suggesting developing pulmonary infarct.    05/28/2022 echocardiogram with 60% EF, normal RA RV and LV size.  Mild LA enlargement.      Past Medical History:   Diagnosis Date    Allergy     Depression     DVT (deep venous thrombosis)        Past Surgical History:   Procedure Laterality Date      SECTION      X3    DILATION AND CURETTAGE OF UTERUS         Family History   Problem Relation Name Age of Onset    Heart disease Father         Social History     Socioeconomic History    Marital status: Single   Tobacco Use    Smoking status: Unknown    Smokeless tobacco: Never   Substance and Sexual Activity    Alcohol use: Yes     Comment: social    Drug use: Never       Current Outpatient Medications   Medication Sig Dispense Refill    loratadine (CLARITIN REDITABS) 10 mg dissolvable tablet Take 10 mg by mouth once daily.      apixaban (ELIQUIS) 5 mg Tab Take 1 tablet (5 mg total) by mouth 2 (two) times daily. 60 tablet 1    ELIQUIS 5 mg Tab Take 1 tablet (5 mg total) by mouth 2 (two) times daily. 60 tablet 5    fluticasone propionate (FLONASE) 50 mcg/actuation nasal spray 2 sprays by Each Nostril route once daily.       No current facility-administered medications for this visit.       Review of patient's allergies indicates:  No Known Allergies    Review of system  CONSTITUTIONAL: no fevers, no chills, no weight loss, no fatigue, no weakness  HEMATOLOGIC: no abnormal bleeding, no abnormal bruising, no drenching night sweats  ONCOLOGIC: no new masses or lumps  HEENT: no vision loss, no tinnitus or hearing loss, no nose bleeding, no dysphagia, no odynophagia  CVS: no chest pain, no palpitations, no dyspnea on exertion, +RLE edema  RESP: no shortness of breath, no hemoptysis, no cough  GI: no nausea, no vomiting, no diarrhea, no constipation, no melena, no hematochezia, no hematemesis, no abdominal pain, no increase in abdominal girth  : no dysuria, no hematuria, no hesitancy, no scrotal swelling, no discharge  INTEGUMENT: no rashes, no abnormal bruising, no nail pitting, no hyperpigmentation  NEURO: no falls, no memory loss, no paresthesias or dysesthesias, no urofecal incontinence or retention, no loss of strength on any extremity  MSK: no back pain, no new joint pain, no joint swelling  PSYCH: no  "suicidal or homicidal ideation, no depression, no insomnia, no anhedonia, + intermittently tearful.  ENDOCRINE: no heat or cold intolerance, no polyuria, no polydipsia    Physical Exam:  Height 5' 4" (1.626 m), weight 124.3 kg (274 lb).   ECOG PS 1-2  GA: AAOx3, NAD  HEENT: NCAT, PERRLA, EOMI, good dentition, moist oral mucous membranes  LYMPH: no cervical, axillary or supraclavicular adenopathy  CVS: s1s2 RRR, no M/R/G  RESP: CTA b/l, no crackles, no wheezes or rhonchi  ABD: soft, NT, ND, BS+, no hepatosplenomegaly, 1 cm lipoma in the right anterior abdominal 1.   EXT: no deformities, + 2+ right lower extremity edema, pitting.   SKIN: no rashes, warm and dry  NEURO: normal mentation, strength 5/5 on all 4 extremities, no sensory deficits    Assessment plan  # Right lower extremity DVT with bilateral PE, likely unprovoked  - Patient presented with symptoms of right lower extremity swelling and was found to have a right lower extremity extensive DVT as well as bilateral PE without right heart strain, concerning findings for evolving left lower lobe infarct.  - Risk factors including obesity and smoking.  No other obvious provoking factor.  - Patient is not up-to-date with her cancer screening.  - Discussed the plan to obtain hypercoagulable workup including Factor 5 Leiden, prothrombin gene mutation, antiphospholipid antibodies.  We will not obtain lupus anticoagulant, protein C protein S and antithrombin levels since Eliquis will interfere with this testing.  All other tests obtained on October of 2022 did not point to a thrombophilic state  - Plan to continue Eliquis 5 mg b.i.d. for at least a period of 6 months pending above workup.  - Discussed concept of discontinuing Eliquis given patient's sustained abstinence from smoking and low D-dimer level. Patient declines at this time and would like to continue on weight loss journey. She will reconsider at next clinic visit.   - If patient is agreeable to stopping " Eliquis at next visit, we can plan to f/u clinically in 3 months after Eliquis D/C'd with repeat antiphospholipid panel, Protein C Ag and Protein S Ag, and antithrombin antigen.      Discussed with patient the importance of tobacco cessation.  Counseled on tobacco cessation today, after discussing the adverse effects of tobacco use, including the increased risks of heart attack, stroke, emphysema, and COPD, and lung cancer, she was not ready to quit.  Strategies to maximize success were reviewed, including setting a quit date and reducing the number of cigarettes or amount and frequency of nicotine containing products used and behavioral therapy       Plan   Placed another referral for PCP today and encouraged patient to follow-up. She has seen a provider at [facility] in the past so we will get her to re-establish care again.   Will follow-up with telephone in 4 weeks to confirm patient is established with PCP for wellness visits, smoking cessation, and weight loss.  Once established with PCP will D/C from clinic      The patient is in agreement with today's plan of care.  All questions answered.  Patient is aware to contact our office for any problems or concerns prior to next visit.                                    This service was not originating from a related E/M service provided within the previous 7 days nor will  to an E/M service or procedure within the next 24 hours or my soonest available appointment.  Prevailing standard of care was able to be met in this audio-only visit.

## 2024-08-22 NOTE — PROGRESS NOTES
Established Patient - Audio Only Telehealth Visit     The patient location is: home  The chief complaint leading to consultation is: follow up  Visit type: Virtual visit with audio only (telephone)  Total time spent with patient: 25 minutes       The reason for the audio only service rather than synchronous audio and video virtual visit was related to technical difficulties or patient preference/necessity.     Each patient to whom I provide medical services by telemedicine is:  (1) informed of the relationship between the physician and patient and the respective role of any other health care provider with respect to management of the patient; and (2) notified that they may decline to receive medical services by telemedicine and may withdraw from such care at any time. Patient verbally consented to receive this service via voice-only telephone call.      Established Patient - Audio Only Telehealth Visit       Referring provider:  [facility] consult  Reason for consult:  Unprovoked right lower extremity DVT with bilateral PE.    HPI  Patient is a 51-year-old without significant medical history except for BCT and smoking who presented to hospital with symptoms of right leg pain and shortness of breath over the last couple weeks prior to presentation.  In the hospital she was found to have bilateral PE without right heart strain as well as right lower extremity DVT from the proximal femoral vein throughout the proximal lower leg.  Patient CT chest PE protocol also revealed a possibility of evolving left lung base infarct.  Patient started on Lovenox in the hospital and transition to Eliquis prior to discharge.  Today patient reports improvement in his symptoms of right lower extremity swelling however persistent discomfort.  She denies any chest pain or palpitations or shortness of breath.  She reports compliance with Eliquis currently on 5 mg b.i.d. after finishing a loading.  She denies any symptoms of clinical  bleeding.    Patient denies any family history of blood disorders.  She has a family history of breast cancer in aunt, was a current smoker when she was diagnosed with DVT, at least 20 pack-year smoking history.  Denied any alcohol use or recreational drug use.  She is  with an ECOG of 0.  Works with the [employer].    INTERVALHx:    08/23/2024:  Ms. Schulte is here today for follow-up as we are trying to get confirm that she is set up with primary care which she has not done. She has been off Eliquis for some time now and doing well. Her anticoagulant workup was unremarkable for any deficiencies.   She continues smoking a 1/2 ppd daily.  States, she is undergoing a lot of family stress.  She denies chest pain, SOB, dry cough, heart palpitations, lower extremity swelling and pain, epistaxis, gingival bleeding, hemoptysis, hematemesis, hematuria, hematochezia, melena, and excessive bruising   Eating and drinking.  Weight is stable,  No recent hospitalizations, infections, or illnesses.     Labs:  07/12/24: CMP and CBC unremarkable, D-Dimer <200, Antithrombin antigen 111,Beta-2 glycoprotein I IgG and IgM <9, Anticardiolipin IgG and IgM <9, Lupus anticoagulant Negative, Protein S 135, Protein S free 146, Protein C 143, Factor II DNA analysis Not Detected.   7/9/2024:  Creatinine 0.77, liver enzymes and T. Bili WNL, WBC 6.48, Hgb 13.7, 41.7, Plt 273,000.    12/12/23: CMP WNL, wbc 6.79, hgb 15.2, plt 299, ANC 3.64, D-dimer <200.     Imaging  05/27/2022 right lower extremity ultrasound:  Occlusive DVT from the proximal femoral vein through the proximal lower leg.    05/27/2022 CT chest PE protocol:  Bilateral pulmonary emboli most severely involving the left lower lobe, a small area of developing pulmonary opacities suggesting developing pulmonary infarct.    05/28/2022 echocardiogram with 60% EF, normal RA RV and LV size.  Mild LA enlargement.      Past Medical History:   Diagnosis Date    Allergy     Depression      DVT (deep venous thrombosis)        Past Surgical History:   Procedure Laterality Date     SECTION      X3    DILATION AND CURETTAGE OF UTERUS         Family History   Problem Relation Name Age of Onset    Heart disease Father         Social History     Socioeconomic History    Marital status: Single   Tobacco Use    Smoking status: Unknown    Smokeless tobacco: Never   Substance and Sexual Activity    Alcohol use: Yes     Comment: social    Drug use: Never       Current Outpatient Medications   Medication Sig Dispense Refill    apixaban (ELIQUIS) 5 mg Tab Take 1 tablet (5 mg total) by mouth 2 (two) times daily. 60 tablet 1    ELIQUIS 5 mg Tab Take 1 tablet (5 mg total) by mouth 2 (two) times daily. 60 tablet 5    fluticasone propionate (FLONASE) 50 mcg/actuation nasal spray 2 sprays by Each Nostril route once daily.      loratadine (CLARITIN REDITABS) 10 mg dissolvable tablet Take 10 mg by mouth once daily.       No current facility-administered medications for this visit.       Review of patient's allergies indicates:  No Known Allergies    Review of system  CONSTITUTIONAL: no fevers, no chills, no weight loss, no fatigue, no weakness  HEMATOLOGIC: no abnormal bleeding, no abnormal bruising, no drenching night sweats  ONCOLOGIC: no new masses or lumps  HEENT: no vision loss, no tinnitus or hearing loss, no nose bleeding, no dysphagia, no odynophagia  CVS: no chest pain, no palpitations, no dyspnea on exertion, +RLE edema  RESP: no shortness of breath, no hemoptysis, no cough  GI: no nausea, no vomiting, no diarrhea, no constipation, no melena, no hematochezia, no hematemesis, no abdominal pain, no increase in abdominal girth  : no dysuria, no hematuria, no hesitancy, no scrotal swelling, no discharge  INTEGUMENT: no rashes, no abnormal bruising, no nail pitting, no hyperpigmentation  NEURO: no falls, no memory loss, no paresthesias or dysesthesias, no urofecal incontinence or retention, no loss of  strength on any extremity  MSK: no back pain, no new joint pain, no joint swelling  PSYCH: no suicidal or homicidal ideation, no depression, no insomnia, no anhedonia, + intermittently tearful.  ENDOCRINE: no heat or cold intolerance, no polyuria, no polydipsia      Assessment plan  # Right lower extremity DVT with bilateral PE, likely unprovoked  - Patient presented with symptoms of right lower extremity swelling and was found to have a right lower extremity extensive DVT as well as bilateral PE without right heart strain, concerning findings for evolving left lower lobe infarct.  - Risk factors including obesity and smoking.  No other obvious provoking factor.  - Patient is not up-to-date with her cancer screening.  - Discussed the plan to obtain hypercoagulable workup including Factor 5 Leiden, prothrombin gene mutation, antiphospholipid antibodies.  We will not obtain lupus anticoagulant, protein C protein S and antithrombin levels since Eliquis will interfere with this testing.  All other tests obtained on October of 2022 did not point to a thrombophilic state  - Plan to continue Eliquis 5 mg b.i.d. for at least a period of 6 months pending above workup.  - Discussed concept of discontinuing Eliquis given patient's sustained abstinence from smoking and low D-dimer level. Patient declines at this time and would like to continue on weight loss journey. She will reconsider at next clinic visit.   - If patient is agreeable to stopping Eliquis at next visit, we can plan to f/u clinically in 3 months after Eliquis D/C'd with repeat antiphospholipid panel, Protein C Ag and Protein S Ag, and antithrombin antigen.      Discussed with patient the importance of tobacco cessation.  Counseled on tobacco cessation today, after discussing the adverse effects of tobacco use, including the increased risks of heart attack, stroke, emphysema, and COPD, and lung cancer, she was not ready to quit.  Strategies to maximize success  were reviewed, including setting a quit date and reducing the number of cigarettes or amount and frequency of nicotine containing products used and behavioral therapy     Plan   Placed another referral for PCP today and encouraged patient to follow-up. She has seen a provider at [facility] in the past so we will get her to re-establish care again.   Will follow-up with telephone in 6 months to confirm patient is established with PCP for wellness visits  Once established with PCP will D/C from clinic    Camilla SANCHEZ    This service was not originating from a related E/M service provided within the previous 7 days nor will  to an E/M service or procedure within the next 24 hours or my soonest available appointment.  Prevailing standard of care was able to be met in this audio-only visit.

## 2024-08-23 ENCOUNTER — OFFICE VISIT (OUTPATIENT)
Dept: HEMATOLOGY/ONCOLOGY | Facility: CLINIC | Age: 54
End: 2024-08-23
Payer: MEDICAID

## 2024-08-23 VITALS — HEIGHT: 64 IN | BODY MASS INDEX: 46.78 KG/M2 | WEIGHT: 274 LBS

## 2024-08-23 DIAGNOSIS — Z86.718 HISTORY OF DVT (DEEP VEIN THROMBOSIS): Primary | ICD-10-CM

## 2024-08-23 DIAGNOSIS — Z00.00 HEALTHCARE MAINTENANCE: ICD-10-CM

## 2024-08-23 DIAGNOSIS — Z86.711 HISTORY OF PULMONARY EMBOLISM: ICD-10-CM
